# Patient Record
Sex: MALE | Race: WHITE | Employment: FULL TIME | URBAN - METROPOLITAN AREA
[De-identification: names, ages, dates, MRNs, and addresses within clinical notes are randomized per-mention and may not be internally consistent; named-entity substitution may affect disease eponyms.]

---

## 2019-12-30 ENCOUNTER — HOSPITAL ENCOUNTER (INPATIENT)
Age: 34
LOS: 2 days | Discharge: HOME | DRG: 871 | End: 2020-01-01
Attending: EMERGENCY MEDICINE | Admitting: FAMILY MEDICINE
Payer: COMMERCIAL

## 2019-12-30 ENCOUNTER — APPOINTMENT (OUTPATIENT)
Dept: GENERAL RADIOLOGY | Age: 34
DRG: 871 | End: 2019-12-30
Attending: EMERGENCY MEDICINE
Payer: COMMERCIAL

## 2019-12-30 ENCOUNTER — APPOINTMENT (OUTPATIENT)
Dept: CT IMAGING | Age: 34
DRG: 871 | End: 2019-12-30
Attending: EMERGENCY MEDICINE
Payer: COMMERCIAL

## 2019-12-30 DIAGNOSIS — J18.9 MULTIFOCAL PNEUMONIA: Primary | ICD-10-CM

## 2019-12-30 PROBLEM — R09.1 PLEURISY: Status: ACTIVE | Noted: 2019-12-30

## 2019-12-30 PROBLEM — A41.9 SEPSIS (HCC): Status: ACTIVE | Noted: 2019-12-30

## 2019-12-30 LAB
ALBUMIN SERPL-MCNC: 3.8 G/DL (ref 3.5–5)
ALBUMIN/GLOB SERPL: 1.1 {RATIO} (ref 1.2–3.5)
ALP SERPL-CCNC: 57 U/L (ref 50–136)
ALT SERPL-CCNC: 34 U/L (ref 12–65)
ANION GAP SERPL CALC-SCNC: 7 MMOL/L (ref 7–16)
AST SERPL-CCNC: 13 U/L (ref 15–37)
ATRIAL RATE: 111 BPM
BASOPHILS # BLD: 0.1 K/UL (ref 0–0.2)
BASOPHILS NFR BLD: 0 % (ref 0–2)
BILIRUB SERPL-MCNC: 1.2 MG/DL (ref 0.2–1.1)
BUN SERPL-MCNC: 17 MG/DL (ref 6–23)
CALCIUM SERPL-MCNC: 9.6 MG/DL (ref 8.3–10.4)
CALCULATED P AXIS, ECG09: 49 DEGREES
CALCULATED R AXIS, ECG10: 69 DEGREES
CALCULATED T AXIS, ECG11: 21 DEGREES
CHLORIDE SERPL-SCNC: 105 MMOL/L (ref 98–107)
CO2 SERPL-SCNC: 26 MMOL/L (ref 21–32)
CREAT SERPL-MCNC: 1.12 MG/DL (ref 0.8–1.5)
D DIMER PPP FEU-MCNC: 0.7 UG/ML(FEU)
DIAGNOSIS, 93000: NORMAL
DIFFERENTIAL METHOD BLD: ABNORMAL
EOSINOPHIL # BLD: 0 K/UL (ref 0–0.8)
EOSINOPHIL NFR BLD: 0 % (ref 0.5–7.8)
ERYTHROCYTE [DISTWIDTH] IN BLOOD BY AUTOMATED COUNT: 11.8 % (ref 11.9–14.6)
FLUAV AG NPH QL IA: NEGATIVE
FLUBV AG NPH QL IA: NEGATIVE
GLOBULIN SER CALC-MCNC: 3.5 G/DL (ref 2.3–3.5)
GLUCOSE SERPL-MCNC: 116 MG/DL (ref 65–100)
HCT VFR BLD AUTO: 42.7 % (ref 41.1–50.3)
HGB BLD-MCNC: 15.1 G/DL (ref 13.6–17.2)
IMM GRANULOCYTES # BLD AUTO: 0.2 K/UL (ref 0–0.5)
IMM GRANULOCYTES NFR BLD AUTO: 1 % (ref 0–5)
LACTATE BLD-SCNC: 1.77 MMOL/L (ref 0.5–1.9)
LACTATE SERPL-SCNC: 3 MMOL/L (ref 0.4–2)
LYMPHOCYTES # BLD: 1 K/UL (ref 0.5–4.6)
LYMPHOCYTES NFR BLD: 5 % (ref 13–44)
MCH RBC QN AUTO: 32 PG (ref 26.1–32.9)
MCHC RBC AUTO-ENTMCNC: 35.4 G/DL (ref 31.4–35)
MCV RBC AUTO: 90.5 FL (ref 79.6–97.8)
MONOCYTES # BLD: 0.9 K/UL (ref 0.1–1.3)
MONOCYTES NFR BLD: 4 % (ref 4–12)
NEUTS SEG # BLD: 19 K/UL (ref 1.7–8.2)
NEUTS SEG NFR BLD: 90 % (ref 43–78)
NRBC # BLD: 0 K/UL (ref 0–0.2)
P-R INTERVAL, ECG05: 130 MS
PLATELET # BLD AUTO: 196 K/UL (ref 150–450)
PMV BLD AUTO: 9.8 FL (ref 9.4–12.3)
POTASSIUM SERPL-SCNC: 3.6 MMOL/L (ref 3.5–5.1)
PROT SERPL-MCNC: 7.3 G/DL (ref 6.3–8.2)
Q-T INTERVAL, ECG07: 292 MS
QRS DURATION, ECG06: 82 MS
QTC CALCULATION (BEZET), ECG08: 397 MS
RBC # BLD AUTO: 4.72 M/UL (ref 4.23–5.6)
SODIUM SERPL-SCNC: 138 MMOL/L (ref 136–145)
SPECIMEN SOURCE: NORMAL
TROPONIN I SERPL-MCNC: <0.02 NG/ML (ref 0.02–0.05)
VENTRICULAR RATE, ECG03: 111 BPM
WBC # BLD AUTO: 21.2 K/UL (ref 4.3–11.1)

## 2019-12-30 PROCEDURE — 74011000250 HC RX REV CODE- 250: Performed by: EMERGENCY MEDICINE

## 2019-12-30 PROCEDURE — 87040 BLOOD CULTURE FOR BACTERIA: CPT

## 2019-12-30 PROCEDURE — 74011000258 HC RX REV CODE- 258: Performed by: EMERGENCY MEDICINE

## 2019-12-30 PROCEDURE — 94640 AIRWAY INHALATION TREATMENT: CPT

## 2019-12-30 PROCEDURE — 77030013140 HC MSK NEB VYRM -A

## 2019-12-30 PROCEDURE — 96375 TX/PRO/DX INJ NEW DRUG ADDON: CPT | Performed by: EMERGENCY MEDICINE

## 2019-12-30 PROCEDURE — 74011250637 HC RX REV CODE- 250/637: Performed by: EMERGENCY MEDICINE

## 2019-12-30 PROCEDURE — 93005 ELECTROCARDIOGRAM TRACING: CPT | Performed by: EMERGENCY MEDICINE

## 2019-12-30 PROCEDURE — 85025 COMPLETE CBC W/AUTO DIFF WBC: CPT

## 2019-12-30 PROCEDURE — 83605 ASSAY OF LACTIC ACID: CPT

## 2019-12-30 PROCEDURE — 74011250636 HC RX REV CODE- 250/636: Performed by: EMERGENCY MEDICINE

## 2019-12-30 PROCEDURE — 74011250636 HC RX REV CODE- 250/636: Performed by: FAMILY MEDICINE

## 2019-12-30 PROCEDURE — 80053 COMPREHEN METABOLIC PANEL: CPT

## 2019-12-30 PROCEDURE — 65660000000 HC RM CCU STEPDOWN

## 2019-12-30 PROCEDURE — 71046 X-RAY EXAM CHEST 2 VIEWS: CPT

## 2019-12-30 PROCEDURE — 74011636320 HC RX REV CODE- 636/320: Performed by: EMERGENCY MEDICINE

## 2019-12-30 PROCEDURE — 87804 INFLUENZA ASSAY W/OPTIC: CPT

## 2019-12-30 PROCEDURE — 36415 COLL VENOUS BLD VENIPUNCTURE: CPT

## 2019-12-30 PROCEDURE — 85379 FIBRIN DEGRADATION QUANT: CPT

## 2019-12-30 PROCEDURE — 96365 THER/PROPH/DIAG IV INF INIT: CPT | Performed by: EMERGENCY MEDICINE

## 2019-12-30 PROCEDURE — 84484 ASSAY OF TROPONIN QUANT: CPT

## 2019-12-30 PROCEDURE — 77030020263 HC SOL INJ SOD CL0.9% LFCR 1000ML

## 2019-12-30 PROCEDURE — 71260 CT THORAX DX C+: CPT

## 2019-12-30 PROCEDURE — 74011000302 HC RX REV CODE- 302: Performed by: FAMILY MEDICINE

## 2019-12-30 PROCEDURE — 86580 TB INTRADERMAL TEST: CPT | Performed by: FAMILY MEDICINE

## 2019-12-30 PROCEDURE — 74011250637 HC RX REV CODE- 250/637: Performed by: FAMILY MEDICINE

## 2019-12-30 PROCEDURE — 99285 EMERGENCY DEPT VISIT HI MDM: CPT | Performed by: EMERGENCY MEDICINE

## 2019-12-30 RX ORDER — DIPHENHYDRAMINE HYDROCHLORIDE 50 MG/ML
12.5 INJECTION, SOLUTION INTRAMUSCULAR; INTRAVENOUS
Status: DISCONTINUED | OUTPATIENT
Start: 2019-12-30 | End: 2020-01-01 | Stop reason: HOSPADM

## 2019-12-30 RX ORDER — ACETAMINOPHEN 325 MG/1
650 TABLET ORAL
Status: DISCONTINUED | OUTPATIENT
Start: 2019-12-30 | End: 2020-01-01 | Stop reason: HOSPADM

## 2019-12-30 RX ORDER — SODIUM CHLORIDE 9 MG/ML
75 INJECTION, SOLUTION INTRAVENOUS CONTINUOUS
Status: DISCONTINUED | OUTPATIENT
Start: 2019-12-31 | End: 2019-12-30

## 2019-12-30 RX ORDER — BISACODYL 5 MG
5 TABLET, DELAYED RELEASE (ENTERIC COATED) ORAL DAILY PRN
Status: DISCONTINUED | OUTPATIENT
Start: 2019-12-30 | End: 2020-01-01 | Stop reason: HOSPADM

## 2019-12-30 RX ORDER — HYDROMORPHONE HYDROCHLORIDE 1 MG/ML
0.5 INJECTION, SOLUTION INTRAMUSCULAR; INTRAVENOUS; SUBCUTANEOUS
Status: COMPLETED | OUTPATIENT
Start: 2019-12-30 | End: 2019-12-30

## 2019-12-30 RX ORDER — HEPARIN SODIUM 5000 [USP'U]/ML
5000 INJECTION, SOLUTION INTRAVENOUS; SUBCUTANEOUS EVERY 8 HOURS
Status: DISCONTINUED | OUTPATIENT
Start: 2019-12-30 | End: 2020-01-01 | Stop reason: HOSPADM

## 2019-12-30 RX ORDER — GUAIFENESIN 600 MG/1
1200 TABLET, EXTENDED RELEASE ORAL EVERY 12 HOURS
Status: DISCONTINUED | OUTPATIENT
Start: 2019-12-30 | End: 2020-01-01 | Stop reason: HOSPADM

## 2019-12-30 RX ORDER — KETOROLAC TROMETHAMINE 30 MG/ML
30 INJECTION, SOLUTION INTRAMUSCULAR; INTRAVENOUS
Status: COMPLETED | OUTPATIENT
Start: 2019-12-30 | End: 2019-12-30

## 2019-12-30 RX ORDER — SODIUM CHLORIDE 9 MG/ML
75 INJECTION, SOLUTION INTRAVENOUS CONTINUOUS
Status: DISCONTINUED | OUTPATIENT
Start: 2019-12-30 | End: 2019-12-31

## 2019-12-30 RX ORDER — ONDANSETRON 2 MG/ML
4 INJECTION INTRAMUSCULAR; INTRAVENOUS
Status: COMPLETED | OUTPATIENT
Start: 2019-12-30 | End: 2019-12-30

## 2019-12-30 RX ORDER — SODIUM CHLORIDE 0.9 % (FLUSH) 0.9 %
10 SYRINGE (ML) INJECTION
Status: COMPLETED | OUTPATIENT
Start: 2019-12-30 | End: 2019-12-30

## 2019-12-30 RX ORDER — HYDROCODONE BITARTRATE AND ACETAMINOPHEN 5; 325 MG/1; MG/1
1 TABLET ORAL
Status: DISCONTINUED | OUTPATIENT
Start: 2019-12-30 | End: 2020-01-01 | Stop reason: HOSPADM

## 2019-12-30 RX ORDER — DOXYCYCLINE 100 MG/1
100 CAPSULE ORAL
Status: COMPLETED | OUTPATIENT
Start: 2019-12-30 | End: 2019-12-30

## 2019-12-30 RX ORDER — MORPHINE SULFATE 2 MG/ML
1 INJECTION, SOLUTION INTRAMUSCULAR; INTRAVENOUS
Status: DISCONTINUED | OUTPATIENT
Start: 2019-12-30 | End: 2020-01-01 | Stop reason: HOSPADM

## 2019-12-30 RX ORDER — ACETAMINOPHEN 500 MG
1000 TABLET ORAL
Status: COMPLETED | OUTPATIENT
Start: 2019-12-30 | End: 2019-12-30

## 2019-12-30 RX ORDER — SODIUM CHLORIDE 0.9 % (FLUSH) 0.9 %
5-40 SYRINGE (ML) INJECTION AS NEEDED
Status: DISCONTINUED | OUTPATIENT
Start: 2019-12-30 | End: 2020-01-01 | Stop reason: HOSPADM

## 2019-12-30 RX ORDER — SODIUM CHLORIDE 9 MG/ML
125 INJECTION, SOLUTION INTRAVENOUS CONTINUOUS
Status: DISCONTINUED | OUTPATIENT
Start: 2019-12-30 | End: 2019-12-30

## 2019-12-30 RX ORDER — NALOXONE HYDROCHLORIDE 0.4 MG/ML
0.4 INJECTION, SOLUTION INTRAMUSCULAR; INTRAVENOUS; SUBCUTANEOUS AS NEEDED
Status: DISCONTINUED | OUTPATIENT
Start: 2019-12-30 | End: 2020-01-01 | Stop reason: HOSPADM

## 2019-12-30 RX ORDER — ONDANSETRON 2 MG/ML
4 INJECTION INTRAMUSCULAR; INTRAVENOUS
Status: DISCONTINUED | OUTPATIENT
Start: 2019-12-30 | End: 2020-01-01 | Stop reason: HOSPADM

## 2019-12-30 RX ORDER — KETOROLAC TROMETHAMINE 15 MG/ML
15 INJECTION, SOLUTION INTRAMUSCULAR; INTRAVENOUS
Status: DISPENSED | OUTPATIENT
Start: 2019-12-30 | End: 2019-12-31

## 2019-12-30 RX ORDER — SODIUM CHLORIDE 0.9 % (FLUSH) 0.9 %
5-40 SYRINGE (ML) INJECTION EVERY 8 HOURS
Status: DISCONTINUED | OUTPATIENT
Start: 2019-12-30 | End: 2020-01-01 | Stop reason: HOSPADM

## 2019-12-30 RX ORDER — IPRATROPIUM BROMIDE AND ALBUTEROL SULFATE 2.5; .5 MG/3ML; MG/3ML
3 SOLUTION RESPIRATORY (INHALATION)
Status: COMPLETED | OUTPATIENT
Start: 2019-12-30 | End: 2019-12-30

## 2019-12-30 RX ORDER — IBUPROFEN 400 MG/1
400 TABLET ORAL
Status: COMPLETED | OUTPATIENT
Start: 2019-12-30 | End: 2019-12-30

## 2019-12-30 RX ADMIN — CEFTRIAXONE 1 G: 1 INJECTION, POWDER, FOR SOLUTION INTRAMUSCULAR; INTRAVENOUS at 07:36

## 2019-12-30 RX ADMIN — Medication 5 ML: at 20:12

## 2019-12-30 RX ADMIN — KETOROLAC TROMETHAMINE 30 MG: 30 INJECTION, SOLUTION INTRAMUSCULAR at 06:32

## 2019-12-30 RX ADMIN — AZITHROMYCIN MONOHYDRATE 500 MG: 500 INJECTION, POWDER, LYOPHILIZED, FOR SOLUTION INTRAVENOUS at 11:15

## 2019-12-30 RX ADMIN — Medication 10 ML: at 08:25

## 2019-12-30 RX ADMIN — GUAIFENESIN 1200 MG: 600 TABLET ORAL at 20:10

## 2019-12-30 RX ADMIN — ACETAMINOPHEN 1000 MG: 500 TABLET, FILM COATED ORAL at 10:14

## 2019-12-30 RX ADMIN — Medication 10 ML: at 15:20

## 2019-12-30 RX ADMIN — IOPAMIDOL 75 ML: 755 INJECTION, SOLUTION INTRAVENOUS at 08:25

## 2019-12-30 RX ADMIN — DOXYCYCLINE HYCLATE 100 MG: 100 CAPSULE ORAL at 10:14

## 2019-12-30 RX ADMIN — SODIUM CHLORIDE 100 ML: 900 INJECTION, SOLUTION INTRAVENOUS at 08:25

## 2019-12-30 RX ADMIN — HYDROCODONE BITARTRATE AND ACETAMINOPHEN 1 TABLET: 5; 325 TABLET ORAL at 15:39

## 2019-12-30 RX ADMIN — SODIUM CHLORIDE 125 ML/HR: 900 INJECTION, SOLUTION INTRAVENOUS at 11:15

## 2019-12-30 RX ADMIN — IBUPROFEN 400 MG: 400 TABLET, FILM COATED ORAL at 10:14

## 2019-12-30 RX ADMIN — HEPARIN SODIUM 5000 UNITS: 5000 INJECTION INTRAVENOUS; SUBCUTANEOUS at 20:10

## 2019-12-30 RX ADMIN — TUBERCULIN PURIFIED PROTEIN DERIVATIVE 5 UNITS: 5 INJECTION, SOLUTION INTRADERMAL at 11:15

## 2019-12-30 RX ADMIN — HYDROMORPHONE HYDROCHLORIDE 0.5 MG: 1 INJECTION, SOLUTION INTRAMUSCULAR; INTRAVENOUS; SUBCUTANEOUS at 06:32

## 2019-12-30 RX ADMIN — IPRATROPIUM BROMIDE AND ALBUTEROL SULFATE 3 ML: .5; 3 SOLUTION RESPIRATORY (INHALATION) at 07:54

## 2019-12-30 RX ADMIN — KETOROLAC TROMETHAMINE 15 MG: 15 INJECTION, SOLUTION INTRAMUSCULAR; INTRAVENOUS at 11:15

## 2019-12-30 RX ADMIN — ONDANSETRON 4 MG: 2 INJECTION INTRAMUSCULAR; INTRAVENOUS at 06:32

## 2019-12-30 RX ADMIN — SODIUM CHLORIDE 75 ML/HR: 900 INJECTION, SOLUTION INTRAVENOUS at 15:16

## 2019-12-30 RX ADMIN — KETOROLAC TROMETHAMINE 15 MG: 15 INJECTION, SOLUTION INTRAMUSCULAR; INTRAVENOUS at 20:10

## 2019-12-30 NOTE — ED PROVIDER NOTES
He 10year-old male with 12-hour history of sharp right lower chest pain. Radiates to his back. Worse with breathing and movements. Says subjective fever and chills. Very minimal cough nonproductive. No abdominal pain or vomiting. Feels somewhat short of breath. No history of pneumothorax. Vapes but does not smoke. No history of DVT or PE. No trauma recently. The history is provided by the patient. Chest Pain    This is a new problem. The current episode started 6 to 12 hours ago. The problem has been gradually worsening. The problem occurs constantly. The pain is associated with breathing, coughing and movement. The pain is present in the right side. The pain is moderate. The quality of the pain is described as stabbing and sharp. The pain radiates to the mid back. The symptoms are aggravated by movement and deep breathing. Associated symptoms include back pain, a fever and shortness of breath. Pertinent negatives include no abdominal pain, no cough, no diaphoresis, no hemoptysis, no leg pain, no malaise/fatigue, no nausea, no near-syncope, no palpitations, no sputum production and no vomiting. He has tried nothing for the symptoms. No risk factors for CADHis past medical history does not include DM, DVT, HTN or PE. Pertinent negatives include no cardiac catheterization. No past medical history on file. No past surgical history on file. No family history on file.     Social History     Socioeconomic History    Marital status:      Spouse name: Not on file    Number of children: Not on file    Years of education: Not on file    Highest education level: Not on file   Occupational History    Not on file   Social Needs    Financial resource strain: Not on file    Food insecurity:     Worry: Not on file     Inability: Not on file    Transportation needs:     Medical: Not on file     Non-medical: Not on file   Tobacco Use    Smoking status: Never Smoker    Smokeless tobacco: Never Used   Substance and Sexual Activity    Alcohol use: Yes    Drug use: Not on file    Sexual activity: Not on file   Lifestyle    Physical activity:     Days per week: Not on file     Minutes per session: Not on file    Stress: Not on file   Relationships    Social connections:     Talks on phone: Not on file     Gets together: Not on file     Attends Gnosticism service: Not on file     Active member of club or organization: Not on file     Attends meetings of clubs or organizations: Not on file     Relationship status: Not on file    Intimate partner violence:     Fear of current or ex partner: Not on file     Emotionally abused: Not on file     Physically abused: Not on file     Forced sexual activity: Not on file   Other Topics Concern    Not on file   Social History Narrative    Not on file         ALLERGIES: Patient has no known allergies. Review of Systems   Constitutional: Positive for fever. Negative for chills, diaphoresis and malaise/fatigue. Respiratory: Positive for shortness of breath. Negative for cough, hemoptysis and sputum production. Cardiovascular: Positive for chest pain. Negative for palpitations and near-syncope. Gastrointestinal: Negative for abdominal pain, diarrhea, nausea and vomiting. Genitourinary: Negative for dysuria and flank pain. Musculoskeletal: Positive for back pain. Negative for neck pain. Skin: Negative for color change and rash. Neurological: Negative for syncope. All other systems reviewed and are negative. Vitals:    12/30/19 0604   BP: 117/75   Pulse: (!) 117   Resp: 20   Temp: 100.1 °F (37.8 °C)   SpO2: 96%   Weight: 90.7 kg (200 lb)   Height: 5' 9\" (1.753 m)            Physical Exam  Vitals signs and nursing note reviewed. Constitutional:       General: He is not in acute distress. Appearance: He is well-developed. HENT:      Head: Normocephalic and atraumatic.       Right Ear: External ear normal.      Left Ear: External ear normal.      Mouth/Throat:      Pharynx: No oropharyngeal exudate. Eyes:      Conjunctiva/sclera: Conjunctivae normal.      Pupils: Pupils are equal, round, and reactive to light. Neck:      Musculoskeletal: Normal range of motion and neck supple. Cardiovascular:      Rate and Rhythm: Normal rate and regular rhythm. Heart sounds: No murmur. Pulmonary:      Effort: No respiratory distress. Breath sounds: Normal breath sounds. Chest:       Abdominal:      General: Bowel sounds are normal.      Palpations: Abdomen is soft. There is no mass. Tenderness: There is no tenderness. There is no guarding or rebound. Hernia: No hernia is present. Skin:     General: Skin is warm and dry. Neurological:      Mental Status: He is alert and oriented to person, place, and time. Gait: Gait normal.      Comments: Nl speech   Psychiatric:         Speech: Speech normal.          MDM  Number of Diagnoses or Management Options  Diagnosis management comments: Check EKG and troponin although I believe pain be noncardiac. Probable pleurisy. Chest x-ray to check for pneumonia, effusion, pneumothorax. Patient does vape. Check for bilateral infiltrates       Amount and/or Complexity of Data Reviewed  Clinical lab tests: ordered and reviewed  Tests in the radiology section of CPT®: ordered and reviewed  Tests in the medicine section of CPT®: ordered and reviewed  Independent visualization of images, tracings, or specimens: yes (KG shows sinus tachycardia 110.   No ST-T changes or ectopy.)    Risk of Complications, Morbidity, and/or Mortality  Presenting problems: moderate  Diagnostic procedures: minimal  Management options: low    Patient Progress  Patient progress: stable    ED Course as of Dec 30 0759   Mon Dec 30, 2019   0700 Handoff from Dr. Robin Campos reviewed  WBC elevated, CXR Pending, flu negative    [GH]   0756 0.7   D DIMER [GH]      ED Course User Index  [GH] Melquiades Alicea MD Procedures        CXR- concerning for RUL pneumonia    Recent Results (from the past 8 hour(s))   EKG, 12 LEAD, INITIAL    Collection Time: 12/30/19  6:06 AM   Result Value Ref Range    Ventricular Rate 111 BPM    Atrial Rate 111 BPM    P-R Interval 130 ms    QRS Duration 82 ms    Q-T Interval 292 ms    QTC Calculation (Bezet) 397 ms    Calculated P Axis 49 degrees    Calculated R Axis 69 degrees    Calculated T Axis 21 degrees    Diagnosis       !! AGE AND GENDER SPECIFIC ECG ANALYSIS !! Sinus tachycardia  Nonspecific T wave abnormality  Abnormal ECG  No previous ECGs available     CBC WITH AUTOMATED DIFF    Collection Time: 12/30/19  6:09 AM   Result Value Ref Range    WBC 21.2 (H) 4.3 - 11.1 K/uL    RBC 4.72 4.23 - 5.6 M/uL    HGB 15.1 13.6 - 17.2 g/dL    HCT 42.7 41.1 - 50.3 %    MCV 90.5 79.6 - 97.8 FL    MCH 32.0 26.1 - 32.9 PG    MCHC 35.4 (H) 31.4 - 35.0 g/dL    RDW 11.8 (L) 11.9 - 14.6 %    PLATELET 731 836 - 085 K/uL    MPV 9.8 9.4 - 12.3 FL    ABSOLUTE NRBC 0.00 0.0 - 0.2 K/uL    DF AUTOMATED      NEUTROPHILS 90 (H) 43 - 78 %    LYMPHOCYTES 5 (L) 13 - 44 %    MONOCYTES 4 4.0 - 12.0 %    EOSINOPHILS 0 (L) 0.5 - 7.8 %    BASOPHILS 0 0.0 - 2.0 %    IMMATURE GRANULOCYTES 1 0.0 - 5.0 %    ABS. NEUTROPHILS 19.0 (H) 1.7 - 8.2 K/UL    ABS. LYMPHOCYTES 1.0 0.5 - 4.6 K/UL    ABS. MONOCYTES 0.9 0.1 - 1.3 K/UL    ABS. EOSINOPHILS 0.0 0.0 - 0.8 K/UL    ABS. BASOPHILS 0.1 0.0 - 0.2 K/UL    ABS. IMM.  GRANS. 0.2 0.0 - 0.5 K/UL   METABOLIC PANEL, COMPREHENSIVE    Collection Time: 12/30/19  6:09 AM   Result Value Ref Range    Sodium 138 136 - 145 mmol/L    Potassium 3.6 3.5 - 5.1 mmol/L    Chloride 105 98 - 107 mmol/L    CO2 26 21 - 32 mmol/L    Anion gap 7 7 - 16 mmol/L    Glucose 116 (H) 65 - 100 mg/dL    BUN 17 6 - 23 MG/DL    Creatinine 1.12 0.8 - 1.5 MG/DL    GFR est AA >60 >60 ml/min/1.73m2    GFR est non-AA >60 >60 ml/min/1.73m2    Calcium 9.6 8.3 - 10.4 MG/DL    Bilirubin, total 1.2 (H) 0.2 - 1.1 MG/DL ALT (SGPT) 34 12 - 65 U/L    AST (SGOT) 13 (L) 15 - 37 U/L    Alk. phosphatase 57 50 - 136 U/L    Protein, total 7.3 6.3 - 8.2 g/dL    Albumin 3.8 3.5 - 5.0 g/dL    Globulin 3.5 2.3 - 3.5 g/dL    A-G Ratio 1.1 (L) 1.2 - 3.5     TROPONIN I    Collection Time: 12/30/19  6:09 AM   Result Value Ref Range    Troponin-I, Qt. <0.02 (L) 0.02 - 0.05 NG/ML   D DIMER    Collection Time: 12/30/19  6:09 AM   Result Value Ref Range    D DIMER 0.70 (HH) <0.56 ug/ml(FEU)   INFLUENZA A & B AG (RAPID TEST)    Collection Time: 12/30/19  6:34 AM   Result Value Ref Range    Influenza A Ag NEGATIVE  NEG      Influenza B Ag NEGATIVE  NEG      Source NASOPHARYNGEAL       Certainly pneumonia would cause this presentation with high white count low-grade fever tachycardia but could also have PE with pulmonary infarct. Patient has significant pain requiring opiate medications for relief. I believe a CT of the chest to further evaluate is indicated.  ======================  CT of the chest is negative for pulmonary emboli. He has a obvious right upper lobe pneumonia. I suspect he also has a left lower lobe pneumonia. He is otherwise healthy. Previously been well. Suddenly became sick yesterday. Having shaking chills chest pain requiring opiate medications    Suspect he has strep pneumo. Initially patient was going to be discharged. Was given antibiotics. Blood cultures were not drawn initially. After treatment here in the emergency department. Patient was ambulated. He became tachycardic into the 140s. Oxygen saturations on room air while laying in bed are 91 to 93%. I believe he would benefit from short admission for continued antibiotics observation. We will obtain blood cultures at this time. Request sputum cultures. Impression and Disposition:      Impression: No diagnosis found.      Condition at disposition: stable    Disposition:  Admission    Follow-up:   Follow-up Information    None          Discharge Medications:   Current Discharge Medication List           Abiodun Brown MD; 12/30/2019 @8:01 AM

## 2019-12-30 NOTE — ED TRIAGE NOTES
PT sts \"I can't catch my breath. It feels like something is stabbing me from the front to the back. I've been having a little bit of a fever\"    Pt presents ambulatory to triage in moderate distress. PT complains of pain to right side of chest. Pain described as stabbing \"all the way from the front through to my back\" Onset of sx yesterday. Pt described as sharp. Pt took 4 ibuprofen around 0300 with no relief. PT also complains of nausea. Pt denies cardiac hx. Pt wife confirms HTN. Pt vapes.

## 2019-12-30 NOTE — PROGRESS NOTES
TRANSFER - IN REPORT:    Verbal report received from Vesna Hernandez RN (name) on Washington Zapata  being received from ED (unit) for routine progression of care      Report consisted of patients Situation, Background, Assessment and   Recommendations(SBAR). Information from the following report(s) SBAR, Kardex and ED Summary was reviewed with the receiving nurse. Opportunity for questions and clarification was provided. Assessment completed upon patients arrival to unit and care assumed. SBAR received and given to primary receiving RN, Karen Baeza. Patient not on 8th floor @ transfer-in time.

## 2019-12-30 NOTE — PROGRESS NOTES
Problem: Falls - Risk of  Goal: *Absence of Falls  Description  Document Tonia Shipleysammy Fall Risk and appropriate interventions in the flowsheet.   Outcome: Progressing Towards Goal  Note: Fall Risk Interventions:            Medication Interventions: Patient to call before getting OOB    Elimination Interventions: Call light in reach, Patient to call for help with toileting needs              Problem: Patient Education: Go to Patient Education Activity  Goal: Patient/Family Education  Outcome: Progressing Towards Goal

## 2019-12-30 NOTE — PROGRESS NOTES
Assumed care of patient. Assessment completed and documented, see docflow. Patient arrived via stretcher; accompanied by transport. Patient alert and oriented in all spheres. Denies pain or needs. NAD noted at present. Respirations even and non labored on RA. Spouse at bedside encouraged to call for needs. Call light within reach. Will continue to monitor.

## 2019-12-30 NOTE — ED NOTES
Completed ambulating o2 sat at this time. Patient resting HR 111bpm with resting o2 sat 96% on RA. Upon movement patient HR increased to 142bpm with o2 sat fluctuating between % on RA. Patient was symptomatic with noted SOB and increased fatigue.

## 2019-12-30 NOTE — H&P
Hospitalist H&P Note     Admit Date:  2019  5:59 AM   Name:  Keysha Kan   Age:  27 y.o.  :  1985   MRN:  [id number]   PCP:  None  Treatment Team: Attending Provider: Trinh Rios MD; Primary Nurse: Charma Galeazzi, RN  Cough, fever, right-sided chest pain, shortness of breath  HPI:   59-year-old  male patient with no medical problems. Does do vaping, last was on Friday. Yesterday patient started feeling weak, bilateral lower extremity cramps, fever. This morning around 3 he woke up with shortness of breath or right-sided chest pain radiating to the back and cough. As the pain and shortness of breath got worse decided to come to emergency room for further evaluation. Right-sided chest pain, sharp in nature, worsened on taking a deep breath in. Cough dry. Patient otherwise did not complain of any dizziness or any bilateral lower extremity pain or swelling or any nausea vomiting or abdominal pain. Did not complain any left-sided chest pain. In the emergency room he was found to be tachycardic EKG showed sinus tachycardia, troponin less than 0.02 ,small movement with increased heart rate to a high of 140s. Patient had an elevated d-dimer of 0.7, CT chest with contrast showed right upper lobe pneumonia. WBC 21.2. Influenza negative. Temperature of 101.5 in the emergency room. Patient does meet the criteria for sepsis, will also be admitted for right-sided pneumonic process.     10 systems reviewed and negative except as noted in HPI.   past medical history -nil  past surgical history -nil  No Known Allergies   Social History     Tobacco Use    Smoking status: yes    Smokeless tobacco:  Vaping   Substance Use Topics    Alcohol use: Yes      family history - htn  Immunization History   Administered Date(s) Administered    TB Skin Test (PPD) Intradermal 2019     PTA Medications:  Prior to Admission Medications   Prescriptions Last Dose Informant Patient Reported? Taking?   oxyCODONE-acetaminophen (PERCOCET) 5-325 mg per tablet   No No   Sig: Take 1 Tab by mouth every four (4) hours as needed for Pain. Max Daily Amount: 6 Tabs. Facility-Administered Medications: None       Objective:     Patient Vitals for the past 24 hrs:   Temp Pulse Resp BP SpO2   12/30/19 1319 -- 99 -- 93/59 93 %   12/30/19 1239 -- (!) 108 -- 97/61 93 %   12/30/19 1200 -- (!) 106 -- 92/60 94 %   12/30/19 1139 -- (!) 116 -- 102/60 92 %   12/30/19 1125 98.8 °F (37.1 °C) -- -- -- --   12/30/19 1119 -- (!) 115 26 109/62 94 %   12/30/19 1001 (!) 101.5 °F (38.6 °C) -- -- -- --   12/30/19 0754 -- -- -- -- 97 %   12/30/19 0606 -- (!) 115 -- 117/75 96 %   12/30/19 0604 100.1 °F (37.8 °C) (!) 117 20 117/75 96 %     Oxygen Therapy  O2 Sat (%): 93 % (12/30/19 1319)  Pulse via Oximetry: 100 beats per minute (12/30/19 1319)  O2 Device: Room air (12/30/19 0754)  No intake or output data in the 24 hours ending 12/30/19 1339    Physical Exam:  General:    Well nourished. Alert. Eyes:   Normal sclera. Extraocular movements intact. ENT:  Normocephalic, atraumatic. Moist mucous membranes  CV:   Tachycardic no murmur, rub, or gallop. Lungs:  Mild coarse breath sounds right greater than left . Complains of sharp pressure chest pain on taking a deep breath in.  abdomen: Soft, nontender, nondistended. Bowel sounds normal.   Extremities: Warm and dry. No cyanosis or edema. Neurologic: CN II-XII grossly intact. Sensation intact. Skin:     No rashes or jaundice. Psych:  Normal mood and affect. I reviewed the labs, imaging, EKGs, telemetry, and other studies done this admission.   Data Review:   Recent Results (from the past 24 hour(s))   EKG, 12 LEAD, INITIAL    Collection Time: 12/30/19  6:06 AM   Result Value Ref Range    Ventricular Rate 111 BPM    Atrial Rate 111 BPM    P-R Interval 130 ms    QRS Duration 82 ms    Q-T Interval 292 ms    QTC Calculation (Bezet) 397 ms    Calculated P Axis 49 degrees    Calculated R Axis 69 degrees    Calculated T Axis 21 degrees    Diagnosis       !! AGE AND GENDER SPECIFIC ECG ANALYSIS !! Sinus tachycardia  Nonspecific T wave abnormality  Abnormal ECG  No previous ECGs available  Confirmed by ST VICENTE MARTINEZ MD (), ABYB MADDOX ([id number]) on 12/30/2019 9:04:15 AM     CBC WITH AUTOMATED DIFF    Collection Time: 12/30/19  6:09 AM   Result Value Ref Range    WBC 21.2 (H) 4.3 - 11.1 K/uL    RBC 4.72 4.23 - 5.6 M/uL    HGB 15.1 13.6 - 17.2 g/dL    HCT 42.7 41.1 - 50.3 %    MCV 90.5 79.6 - 97.8 FL    MCH 32.0 26.1 - 32.9 PG    MCHC 35.4 (H) 31.4 - 35.0 g/dL    RDW 11.8 (L) 11.9 - 14.6 %    PLATELET 171 461 - 476 K/uL    MPV 9.8 9.4 - 12.3 FL    ABSOLUTE NRBC 0.00 0.0 - 0.2 K/uL    DF AUTOMATED      NEUTROPHILS 90 (H) 43 - 78 %    LYMPHOCYTES 5 (L) 13 - 44 %    MONOCYTES 4 4.0 - 12.0 %    EOSINOPHILS 0 (L) 0.5 - 7.8 %    BASOPHILS 0 0.0 - 2.0 %    IMMATURE GRANULOCYTES 1 0.0 - 5.0 %    ABS. NEUTROPHILS 19.0 (H) 1.7 - 8.2 K/UL    ABS. LYMPHOCYTES 1.0 0.5 - 4.6 K/UL    ABS. MONOCYTES 0.9 0.1 - 1.3 K/UL    ABS. EOSINOPHILS 0.0 0.0 - 0.8 K/UL    ABS. BASOPHILS 0.1 0.0 - 0.2 K/UL    ABS. IMM. GRANS. 0.2 0.0 - 0.5 K/UL   METABOLIC PANEL, COMPREHENSIVE    Collection Time: 12/30/19  6:09 AM   Result Value Ref Range    Sodium 138 136 - 145 mmol/L    Potassium 3.6 3.5 - 5.1 mmol/L    Chloride 105 98 - 107 mmol/L    CO2 26 21 - 32 mmol/L    Anion gap 7 7 - 16 mmol/L    Glucose 116 (H) 65 - 100 mg/dL    BUN 17 6 - 23 MG/DL    Creatinine 1.12 0.8 - 1.5 MG/DL    GFR est AA >60 >60 ml/min/1.73m2    GFR est non-AA >60 >60 ml/min/1.73m2    Calcium 9.6 8.3 - 10.4 MG/DL    Bilirubin, total 1.2 (H) 0.2 - 1.1 MG/DL    ALT (SGPT) 34 12 - 65 U/L    AST (SGOT) 13 (L) 15 - 37 U/L    Alk.  phosphatase 57 50 - 136 U/L    Protein, total 7.3 6.3 - 8.2 g/dL    Albumin 3.8 3.5 - 5.0 g/dL    Globulin 3.5 2.3 - 3.5 g/dL    A-G Ratio 1.1 (L) 1.2 - 3.5     TROPONIN I    Collection Time: 12/30/19  6:09 AM Result Value Ref Range    Troponin-I, Qt. <0.02 (L) 0.02 - 0.05 NG/ML   D DIMER    Collection Time: 12/30/19  6:09 AM   Result Value Ref Range    D DIMER 0.70 (HH) <0.56 ug/ml(FEU)   INFLUENZA A & B AG (RAPID TEST)    Collection Time: 12/30/19  6:34 AM   Result Value Ref Range    Influenza A Ag NEGATIVE  NEG      Influenza B Ag NEGATIVE  NEG      Source NASOPHARYNGEAL     POC LACTIC ACID    Collection Time: 12/30/19 11:31 AM   Result Value Ref Range    Lactic Acid (POC) 1.77 0.5 - 1.9 mmol/L       All Micro Results     Procedure Component Value Units Date/Time    CULTURE, BLOOD [[id number]] Collected:  12/30/19 1108    Order Status:  Completed Specimen:  Blood Updated:  12/30/19 1134    CULTURE, BLOOD [[id number]] Collected:  12/30/19 1102    Order Status:  Completed Specimen:  Blood Updated:  12/30/19 1134    CULTURE, RESPIRATORY/SPUTUM/BRONCH Alex Gupta STAIN [[id number]]     Order Status:  Sent Specimen:  Sputum     INFLUENZA A & B AG (RAPID TEST) [[id number]] Collected:  12/30/19 0634    Order Status:  Completed Specimen:  Nasopharyngeal from Nasal washing Updated:  12/30/19 0654     Influenza A Ag NEGATIVE         Comment: NEGATIVE FOR THE PRESENCE OF INFLUENZA A ANTIGEN  INFECTION DUE TO INFLUENZA A CANNOT BE RULED OUT. BECAUSE THE ANTIGEN PRESENT IN THE SAMPLE MAY BE BELOW  THE DETECTION LIMIT OF THE TEST. A NEGATIVE TEST IS PRESUMPTIVE AND IT IS RECOMMENDED THAT THESE RESULTS BE CONFIRMED BY VIRAL CULTURE OR AN FDA-CLEARED INFLUENZA A AND B MOLECULAR ASSAY. Influenza B Ag NEGATIVE         Comment: NEGATIVE FOR THE PRESENCE OF INFLUENZA B ANTIGEN  INFECTION DUE TO INFLUENZA B CANNOT BE RULED OUT. BECAUSE THE ANTIGEN PRESENT IN THE SAMPLE MAY BE BELOW  THE DETECTION LIMIT OF THE TEST. A NEGATIVE TEST IS PRESUMPTIVE AND IT IS RECOMMENDED THAT THESE RESULTS BE CONFIRMED BY VIRAL CULTURE OR AN FDA-CLEARED INFLUENZA A AND B MOLECULAR ASSAY.           Source NASOPHARYNGEAL             Other Studies:  Xr Chest Pa Lat    Result Date: 12/30/2019  Frontal and lateral views of the chest 12/30/2019 Comparison: none Indication: Right-sided chest pain, shortness of breath FINDINGS: The cardiac and mediastinal contours are within normal limits. There is focal infiltrate with volume loss right upper lobe posteriorly adjacent to the superior aspect of the right major fissure on the lateral view. No discrete acute osseous lesion seen. IMPRESSION: Favor right upper lobe pneumonia. Recommend PA and lateral chest in one month to assure resolution. Ct Chest W Cont    Result Date: 12/30/2019  CT OF THE CHEST WITH INTRAVENOUS CONTRAST. INDICATION: Right upper lobe pneumonia. Leukocytosis. Right-sided chest pain radiating to back. COMPARISON: None. TECHNIQUE:   2.5 mm axial scans from above the aortic arch to the lung bases with 75 cc nonionic intravenous contrast without acute complication. Intravenous contrast was given to evaluate for pulmonary embolism. Radiation dose reduction techniques were used for this study. Our CT scanners use one or more of the following:  Automated exposure control, adjustment of the mA and or kV according to patient size, iterative reconstruction. FINDINGS:  -Pulmonary artery opacification: Adequate  -No intraluminal filling defects within the pulmonary arterial tree to suggest pulmonary embolism.  -Aorta is normal caliber with a uniform lumen without evidence of dissection. -Mediastinum/surjit: No grossly enlarged lymph nodes. -Lungs lobar consolidation posterior segment right upper lobe with a smaller focus anterior segment.  -Included portion of the upper abdomen unremarkable. Adrenal glands normal size. No gross bony lesions. .     IMPRESSION:  Right upper lobe pneumonia.        Assessment and Plan:     Hospital Problems as of 12/30/2019 Never Reviewed          Codes Class Noted - Resolved POA    Pleurisy ICD-10-CM: R09.1  ICD-9-CM: 511.0  12/30/2019 - Present Unknown        Sepsis ([facility] ICD-10-CM: A41.9  ICD-9-CM: 038.9, 995.91  12/30/2019 - Present Unknown        PNA (pneumonia) ICD-10-CM: J18.9  ICD-9-CM: 024  12/30/2019 - Present Unknown              PLAN:  -Sepsis-  -right upper lobe pneumonia-continue Rocephin and Zithromax, will also PPD. -Pleuritic retro-chest pain secondary pneumonia    Advised on stopping vaping.     Advance life care discussed with patient, full code    DVT ppx: Heparin  Anticipated DC needs:    Code status:  Full  Estimated LOS:  Greater than 2 midnights  Risk:  high    Signed:  Amisha Dunlap MD

## 2019-12-30 NOTE — ED NOTES
TRANSFER - OUT REPORT:    Verbal report given to [id number] (name) on Roxy Carlos  being transferred to [address] (unit) for routine progression of care       Report consisted of patients Situation, Background, Assessment and   Recommendations(SBAR). Information from the following report(s) SBAR was reviewed with the receiving nurse. Lines:   Peripheral IV 12/30/19 Right Antecubital (Active)        Opportunity for questions and clarification was provided.       Patient transported with:   [employer]

## 2019-12-31 LAB
ANION GAP SERPL CALC-SCNC: 8 MMOL/L (ref 7–16)
BASOPHILS # BLD: 0 K/UL (ref 0–0.2)
BASOPHILS NFR BLD: 0 % (ref 0–2)
BUN SERPL-MCNC: 14 MG/DL (ref 6–23)
CALCIUM SERPL-MCNC: 9 MG/DL (ref 8.3–10.4)
CHLORIDE SERPL-SCNC: 109 MMOL/L (ref 98–107)
CO2 SERPL-SCNC: 23 MMOL/L (ref 21–32)
CREAT SERPL-MCNC: 0.97 MG/DL (ref 0.8–1.5)
DIFFERENTIAL METHOD BLD: ABNORMAL
EOSINOPHIL # BLD: 0.1 K/UL (ref 0–0.8)
EOSINOPHIL NFR BLD: 1 % (ref 0.5–7.8)
ERYTHROCYTE [DISTWIDTH] IN BLOOD BY AUTOMATED COUNT: 12.1 % (ref 11.9–14.6)
GLUCOSE SERPL-MCNC: 108 MG/DL (ref 65–100)
HCT VFR BLD AUTO: 36.5 % (ref 41.1–50.3)
HGB BLD-MCNC: 12.7 G/DL (ref 13.6–17.2)
IMM GRANULOCYTES # BLD AUTO: 0.6 K/UL (ref 0–0.5)
IMM GRANULOCYTES NFR BLD AUTO: 4 % (ref 0–5)
LACTATE SERPL-SCNC: 1.9 MMOL/L (ref 0.4–2)
LYMPHOCYTES # BLD: 1.3 K/UL (ref 0.5–4.6)
LYMPHOCYTES NFR BLD: 8 % (ref 13–44)
MCH RBC QN AUTO: 31.9 PG (ref 26.1–32.9)
MCHC RBC AUTO-ENTMCNC: 34.8 G/DL (ref 31.4–35)
MCV RBC AUTO: 91.7 FL (ref 79.6–97.8)
MONOCYTES # BLD: 0.7 K/UL (ref 0.1–1.3)
MONOCYTES NFR BLD: 4 % (ref 4–12)
NEUTS SEG # BLD: 14.9 K/UL (ref 1.7–8.2)
NEUTS SEG NFR BLD: 84 % (ref 43–78)
NRBC # BLD: 0 K/UL (ref 0–0.2)
PLATELET # BLD AUTO: 169 K/UL (ref 150–450)
PMV BLD AUTO: 10.2 FL (ref 9.4–12.3)
POTASSIUM SERPL-SCNC: 3.9 MMOL/L (ref 3.5–5.1)
RBC # BLD AUTO: 3.98 M/UL (ref 4.23–5.6)
SODIUM SERPL-SCNC: 140 MMOL/L (ref 136–145)
WBC # BLD AUTO: 17.7 K/UL (ref 4.3–11.1)

## 2019-12-31 PROCEDURE — 80048 BASIC METABOLIC PNL TOTAL CA: CPT

## 2019-12-31 PROCEDURE — 74011250637 HC RX REV CODE- 250/637: Performed by: FAMILY MEDICINE

## 2019-12-31 PROCEDURE — 77030020263 HC SOL INJ SOD CL0.9% LFCR 1000ML

## 2019-12-31 PROCEDURE — 36415 COLL VENOUS BLD VENIPUNCTURE: CPT

## 2019-12-31 PROCEDURE — 74011250636 HC RX REV CODE- 250/636: Performed by: FAMILY MEDICINE

## 2019-12-31 PROCEDURE — 65660000000 HC RM CCU STEPDOWN

## 2019-12-31 PROCEDURE — 74011250637 HC RX REV CODE- 250/637: Performed by: INTERNAL MEDICINE

## 2019-12-31 PROCEDURE — 74011000258 HC RX REV CODE- 258: Performed by: FAMILY MEDICINE

## 2019-12-31 PROCEDURE — 85025 COMPLETE CBC W/AUTO DIFF WBC: CPT

## 2019-12-31 RX ORDER — BENZONATATE 100 MG/1
100 CAPSULE ORAL
Status: DISCONTINUED | OUTPATIENT
Start: 2019-12-31 | End: 2020-01-01 | Stop reason: HOSPADM

## 2019-12-31 RX ADMIN — Medication 10 ML: at 05:40

## 2019-12-31 RX ADMIN — GUAIFENESIN 1200 MG: 600 TABLET ORAL at 08:35

## 2019-12-31 RX ADMIN — BENZONATATE 100 MG: 100 CAPSULE ORAL at 03:54

## 2019-12-31 RX ADMIN — HEPARIN SODIUM 5000 UNITS: 5000 INJECTION INTRAVENOUS; SUBCUTANEOUS at 03:27

## 2019-12-31 RX ADMIN — Medication 5 ML: at 13:21

## 2019-12-31 RX ADMIN — GUAIFENESIN 1200 MG: 600 TABLET ORAL at 20:49

## 2019-12-31 RX ADMIN — HYDROCODONE BITARTRATE AND ACETAMINOPHEN 1 TABLET: 5; 325 TABLET ORAL at 20:49

## 2019-12-31 RX ADMIN — CEFTRIAXONE 1 G: 1 INJECTION, POWDER, FOR SOLUTION INTRAMUSCULAR; INTRAVENOUS at 08:38

## 2019-12-31 RX ADMIN — Medication 10 ML: at 20:51

## 2019-12-31 RX ADMIN — HEPARIN SODIUM 5000 UNITS: 5000 INJECTION INTRAVENOUS; SUBCUTANEOUS at 19:18

## 2019-12-31 RX ADMIN — AZITHROMYCIN MONOHYDRATE 500 MG: 500 INJECTION, POWDER, LYOPHILIZED, FOR SOLUTION INTRAVENOUS at 11:01

## 2019-12-31 NOTE — PROGRESS NOTES
Hospitalist Progress Note     Admit Date:  2019  5:59 AM   Name:  Tyler De Dios   Age:  27 y.o.  :  1985   MRN:  [id number]   PCP:  None  Treatment Team: Attending Provider: Thais Lane MD; Staff Nurse: Christa Shoemaker RN; Care Manager: Kayd Vasquez RN; Utilization Review: Staci Elizalde RN; Primary Nurse: Robina Headings    Subjective:   74-year-old  male patient with no medical problems. Does do vaping, last was on Friday. Yesterday patient started feeling weak, bilateral lower extremity cramps, fever. This morning around 3 he woke up with shortness of breath or right-sided chest pain radiating to the back and cough. As the pain and shortness of breath got worse decided to come to emergency room for further evaluation. Patient does meet the criteria for sepsis, will also be admitted for right-sided pneumonic process. 2019  Says right-sided chest pain on taking a deep breath is better. Not tachycardic. Anxious to go home. Objective:     Patient Vitals for the past 24 hrs:   Temp Pulse Resp BP SpO2   19 1510 97.9 °F (36.6 °C) 84 18 123/61 97 %   19 1152 -- 93 -- -- --   19 1117 -- -- -- -- 96 %   19 1116 98.3 °F (36.8 °C) 93 17 133/80 97 %   19 0800 -- (!) 105 -- -- --   19 0726 98.6 °F (37 °C) (!) 105 18 135/75 --   19 0713 98.6 °F (37 °C) (!) 105 18 135/75 96 %   19 0312 99.7 °F (37.6 °C) 99 18 122/78 96 %   19 2315 98.4 °F (36.9 °C) 95 18 122/69 96 %   19 -- 96 -- -- --   19 1921 98.4 °F (36.9 °C) 94 20 99/70 96 %     Oxygen Therapy  O2 Sat (%): 97 % (19 1510)  Pulse via Oximetry: 111 beats per minute (19 1117)  O2 Device: Room air (19 1117)    Intake/Output Summary (Last 24 hours) at 2019 1612  Last data filed at 2019 0900  Gross per 24 hour   Intake 240 ml   Output --   Net 240 ml         General:    Well nourished. Alert. HEENT-normal  CV:   RRR. No murmur, rub, or gallop. Lungs:   Bilateral coarse breath sounds right greater than left improving  Abdomen:   Soft, nontender, nondistended. CNS-no focal neurological deficit  Extremities: Warm and dry. No cyanosis or edema. Skin:     No rashes or jaundice. Data Review:  I have reviewed all labs, meds, telemetry events, and studies from the last 24 hours. Recent Results (from the past 24 hour(s))   LACTIC ACID    Collection Time: 12/30/19  4:22 PM   Result Value Ref Range    Lactic acid 3.0 (HH) 0.4 - 2.0 MMOL/L   LACTIC ACID    Collection Time: 12/30/19 11:32 PM   Result Value Ref Range    Lactic acid 1.9 0.4 - 2.0 MMOL/L   METABOLIC PANEL, BASIC    Collection Time: 12/31/19  9:24 AM   Result Value Ref Range    Sodium 140 136 - 145 mmol/L    Potassium 3.9 3.5 - 5.1 mmol/L    Chloride 109 (H) 98 - 107 mmol/L    CO2 23 21 - 32 mmol/L    Anion gap 8 7 - 16 mmol/L    Glucose 108 (H) 65 - 100 mg/dL    BUN 14 6 - 23 MG/DL    Creatinine 0.97 0.8 - 1.5 MG/DL    GFR est AA >60 >60 ml/min/1.73m2    GFR est non-AA >60 >60 ml/min/1.73m2    Calcium 9.0 8.3 - 10.4 MG/DL   CBC WITH AUTOMATED DIFF    Collection Time: 12/31/19  9:24 AM   Result Value Ref Range    WBC 17.7 (H) 4.3 - 11.1 K/uL    RBC 3.98 (L) 4.23 - 5.6 M/uL    HGB 12.7 (L) 13.6 - 17.2 g/dL    HCT 36.5 (L) 41.1 - 50.3 %    MCV 91.7 79.6 - 97.8 FL    MCH 31.9 26.1 - 32.9 PG    MCHC 34.8 31.4 - 35.0 g/dL    RDW 12.1 11.9 - 14.6 %    PLATELET 197 086 - 178 K/uL    MPV 10.2 9.4 - 12.3 FL    ABSOLUTE NRBC 0.00 0.0 - 0.2 K/uL    DF AUTOMATED      NEUTROPHILS 84 (H) 43 - 78 %    LYMPHOCYTES 8 (L) 13 - 44 %    MONOCYTES 4 4.0 - 12.0 %    EOSINOPHILS 1 0.5 - 7.8 %    BASOPHILS 0 0.0 - 2.0 %    IMMATURE GRANULOCYTES 4 0.0 - 5.0 %    ABS. NEUTROPHILS 14.9 (H) 1.7 - 8.2 K/UL    ABS. LYMPHOCYTES 1.3 0.5 - 4.6 K/UL    ABS. MONOCYTES 0.7 0.1 - 1.3 K/UL    ABS. EOSINOPHILS 0.1 0.0 - 0.8 K/UL    ABS. BASOPHILS 0.0 0.0 - 0.2 K/UL    ABS. IMM. Lacey Voss. 0.6 (H) 0.0 - 0.5 K/UL        All Micro Results     Procedure Component Value Units Date/Time    CULTURE, BLOOD [[id number]] Collected:  12/30/19 1102    Order Status:  Completed Specimen:  Blood Updated:  12/31/19 0917     Special Requests: --        NO SPECIAL REQUESTS  RIGHT  HAND       Culture result: NO GROWTH AFTER 21 HOURS       CULTURE, BLOOD [[id number]] Collected:  12/30/19 1108    Order Status:  Completed Specimen:  Blood Updated:  12/31/19 0917     Special Requests: --        NO SPECIAL REQUESTS  LEFT  HAND       Culture result: NO GROWTH AFTER 21 HOURS       CULTURE, RESPIRATORY/SPUTUM/BRONCH Joe Alpers STAIN [[id number]]     Order Status:  Sent Specimen:  Sputum     INFLUENZA A & B AG (RAPID TEST) [[id number]] Collected:  12/30/19 0634    Order Status:  Completed Specimen:  Nasopharyngeal from Nasal washing Updated:  12/30/19 0654     Influenza A Ag NEGATIVE         Comment: NEGATIVE FOR THE PRESENCE OF INFLUENZA A ANTIGEN  INFECTION DUE TO INFLUENZA A CANNOT BE RULED OUT. BECAUSE THE ANTIGEN PRESENT IN THE SAMPLE MAY BE BELOW  THE DETECTION LIMIT OF THE TEST. A NEGATIVE TEST IS PRESUMPTIVE AND IT IS RECOMMENDED THAT THESE RESULTS BE CONFIRMED BY VIRAL CULTURE OR AN FDA-CLEARED INFLUENZA A AND B MOLECULAR ASSAY. Influenza B Ag NEGATIVE         Comment: NEGATIVE FOR THE PRESENCE OF INFLUENZA B ANTIGEN  INFECTION DUE TO INFLUENZA B CANNOT BE RULED OUT. BECAUSE THE ANTIGEN PRESENT IN THE SAMPLE MAY BE BELOW  THE DETECTION LIMIT OF THE TEST. A NEGATIVE TEST IS PRESUMPTIVE AND IT IS RECOMMENDED THAT THESE RESULTS BE CONFIRMED BY VIRAL CULTURE OR AN FDA-CLEARED INFLUENZA A AND B MOLECULAR ASSAY.           Source NASOPHARYNGEAL             Current Meds:  Current Facility-Administered Medications   Medication Dose Route Frequency    benzonatate (TESSALON) capsule 100 mg  100 mg Oral TID PRN    cefTRIAXone (ROCEPHIN) 1 g in 0.9% sodium chloride (MBP/ADV) 50 mL  1 g IntraVENous Q24H    azithromycin (ZITHROMAX) 500 mg in 0.9% sodium chloride (MBP/ADV) 250 mL  500 mg IntraVENous Q24H    ketorolac (TORADOL) injection 15 mg  15 mg IntraVENous Q6H PRN    sodium chloride (NS) flush 5-40 mL  5-40 mL IntraVENous Q8H    sodium chloride (NS) flush 5-40 mL  5-40 mL IntraVENous PRN    heparin (porcine) injection 5,000 Units  5,000 Units SubCUTAneous Q8H    acetaminophen (TYLENOL) tablet 650 mg  650 mg Oral Q4H PRN    HYDROcodone-acetaminophen (NORCO) 5-325 mg per tablet 1 Tab  1 Tab Oral Q4H PRN    morphine injection 1 mg  1 mg IntraVENous Q4H PRN    naloxone (NARCAN) injection 0.4 mg  0.4 mg IntraVENous PRN    diphenhydrAMINE (BENADRYL) injection 12.5 mg  12.5 mg IntraVENous Q4H PRN    ondansetron (ZOFRAN) injection 4 mg  4 mg IntraVENous Q4H PRN    bisacodyl (DULCOLAX) tablet 5 mg  5 mg Oral DAILY PRN    guaiFENesin ER (MUCINEX) tablet 1,200 mg  1,200 mg Oral Q12H       Other Studies (last 24 hours):  No results found. Assessment and Plan:     Hospital Problems as of 12/31/2019 Never Reviewed          Codes Class Noted - Resolved POA    Pleurisy ICD-10-CM: R09.1  ICD-9-CM: 511.0  12/30/2019 - Present Unknown        * (Principal) Sepsis ([facility] [address] ICD-10-CM: A41.9  ICD-9-CM: 038.9, 995.91  12/30/2019 - Present Unknown        PNA (pneumonia) ICD-10-CM: J18.9  ICD-9-CM: 650  12/30/2019 - Present Unknown              PLAN:    Sepsis-improving  -right upper lobe pneumonia-continue Rocephin and Zithromax, will also PPD. Continue Acapella and incentive spirometry. Sputum cultures ordered-pending.  -Pleuritic retro-chest pain secondary pneumonia-improving     Advised on stopping vaping. Spoke to patient and patient wife who is a nurse practitioner, went through the whole chart.   Recommended at least 1 more day of antibiotics before thinking of discharge.     Advance life care discussed with patient, full code     DVT ppx: Heparin  Anticipated DC needs:    Code status:  Full  Estimated LOS:  Greater than 2 midnights  Risk:  high  Signed:  Dontrell Thompson MD

## 2019-12-31 NOTE — PROGRESS NOTES
Patient is sitting up in . Respirations are even and unlabored. Patient denies any pain. No distress at this time.  Bed is low, locked and call light within reach

## 2019-12-31 NOTE — PROGRESS NOTES
MSN, CM:  Spoke with patient this AM about discharge planning. Patient lives in own home with 7 steps for entrance. Patient and wife also have 4 children that live in the home also (20, 25, 9, 9mts). Patient has mom/dad and grandparents that live close. Patient is independent with all ADL's and requires no equipment for ambulation. Patient does not have a PCP and states \"I have only been sick 2x in my life, I really don't need one\". Patient drives self and has no limitations. Patient does state he is insured with [employer] through his job. Patient's family to bring card in today. Patient does not appear to have any needs from Case Management at this time but Case Management will continue to follow. Care Management Interventions  PCP Verified by CM: Yes(patient has no PCP)  Mode of Transport at Discharge:  Other (see comment)(wife to transport)  Transition of Care Consult (CM Consult): Discharge Planning  Discharge Durable Medical Equipment: No  Physical Therapy Consult: No  Occupational Therapy Consult: No  Speech Therapy Consult: No  Current Support Network: Lives with Spouse, Own Home, Other, Family Lives Nearby(4 children (20,18,9,9 mts))  Confirm Follow Up Transport: Self  Freedom of Choice List was Provided with Basic Dialogue that Supports the Patient's Individualized Plan of Care/Goals, Treatment Preferences and Shares the Quality Data Associated with the Providers?: Yes  [employer] Information Provided?: No  Discharge Location  Discharge Placement: Home

## 2019-12-31 NOTE — PROGRESS NOTES
Received bedside shift report from off going nurse, Chloé Fuller. Patient resting in bed quietly. Respirations even and unlabored on room air. Bed low and locked. Bedside table, personal belongings and call light all within reach.

## 2019-12-31 NOTE — PROGRESS NOTES
Problem: Falls - Risk of  Goal: *Absence of Falls  Description  Document Adolphus Camden Fall Risk and appropriate interventions in the flowsheet.   Outcome: Progressing Towards Goal  Note: Fall Risk Interventions:            Medication Interventions: Patient to call before getting OOB    Elimination Interventions: Call light in reach              Problem: Patient Education: Go to Patient Education Activity  Goal: Patient/Family Education  Outcome: Progressing Towards Goal

## 2019-12-31 NOTE — PROGRESS NOTES
Bedside report received from  [facility]. Assessment completed. Bed is in low and locked position, with floor free of clutter. Respirations even and unlabored. Breath sounds clear. Alert and Oriented x4. RA. Family at bedside. Call light within reach.

## 2020-01-01 VITALS
WEIGHT: 200 LBS | RESPIRATION RATE: 18 BRPM | DIASTOLIC BLOOD PRESSURE: 78 MMHG | SYSTOLIC BLOOD PRESSURE: 119 MMHG | TEMPERATURE: 98.4 F | BODY MASS INDEX: 29.62 KG/M2 | OXYGEN SATURATION: 96 % | HEART RATE: 79 BPM | HEIGHT: 69 IN

## 2020-01-01 LAB
BASOPHILS # BLD: 0 K/UL (ref 0–0.2)
BASOPHILS NFR BLD: 0 % (ref 0–2)
DIFFERENTIAL METHOD BLD: ABNORMAL
EOSINOPHIL # BLD: 0.3 K/UL (ref 0–0.8)
EOSINOPHIL NFR BLD: 2 % (ref 0.5–7.8)
ERYTHROCYTE [DISTWIDTH] IN BLOOD BY AUTOMATED COUNT: 12 % (ref 11.9–14.6)
HCT VFR BLD AUTO: 37.9 % (ref 41.1–50.3)
HGB BLD-MCNC: 13.1 G/DL (ref 13.6–17.2)
IMM GRANULOCYTES # BLD AUTO: 0.1 K/UL (ref 0–0.5)
IMM GRANULOCYTES NFR BLD AUTO: 1 % (ref 0–5)
LYMPHOCYTES # BLD: 2 K/UL (ref 0.5–4.6)
LYMPHOCYTES NFR BLD: 16 % (ref 13–44)
MCH RBC QN AUTO: 31.3 PG (ref 26.1–32.9)
MCHC RBC AUTO-ENTMCNC: 34.6 G/DL (ref 31.4–35)
MCV RBC AUTO: 90.7 FL (ref 79.6–97.8)
MONOCYTES # BLD: 0.6 K/UL (ref 0.1–1.3)
MONOCYTES NFR BLD: 5 % (ref 4–12)
NEUTS SEG # BLD: 9.2 K/UL (ref 1.7–8.2)
NEUTS SEG NFR BLD: 76 % (ref 43–78)
NRBC # BLD: 0 K/UL (ref 0–0.2)
PLATELET # BLD AUTO: 212 K/UL (ref 150–450)
PMV BLD AUTO: 10.2 FL (ref 9.4–12.3)
RBC # BLD AUTO: 4.18 M/UL (ref 4.23–5.6)
WBC # BLD AUTO: 12.2 K/UL (ref 4.3–11.1)

## 2020-01-01 PROCEDURE — 74011000258 HC RX REV CODE- 258: Performed by: FAMILY MEDICINE

## 2020-01-01 PROCEDURE — 36415 COLL VENOUS BLD VENIPUNCTURE: CPT

## 2020-01-01 PROCEDURE — 74011250636 HC RX REV CODE- 250/636: Performed by: FAMILY MEDICINE

## 2020-01-01 PROCEDURE — 85025 COMPLETE CBC W/AUTO DIFF WBC: CPT

## 2020-01-01 PROCEDURE — 74011250637 HC RX REV CODE- 250/637: Performed by: FAMILY MEDICINE

## 2020-01-01 RX ORDER — CEFPODOXIME PROXETIL 200 MG/1
200 TABLET, FILM COATED ORAL 2 TIMES DAILY
Qty: 10 TAB | Refills: 0 | Status: SHIPPED | OUTPATIENT
Start: 2020-01-01 | End: 2020-01-06

## 2020-01-01 RX ORDER — CYCLOBENZAPRINE HCL 5 MG
5 TABLET ORAL
Qty: 10 TAB | Refills: 0 | Status: SHIPPED | OUTPATIENT
Start: 2020-01-01

## 2020-01-01 RX ORDER — BENZONATATE 100 MG/1
100 CAPSULE ORAL
Qty: 20 CAP | Refills: 0 | Status: SHIPPED | OUTPATIENT
Start: 2020-01-01 | End: 2020-01-08

## 2020-01-01 RX ORDER — AZITHROMYCIN 500 MG/1
500 TABLET, FILM COATED ORAL DAILY
Qty: 5 TAB | Refills: 0 | Status: SHIPPED | OUTPATIENT
Start: 2020-01-01 | End: 2020-01-06

## 2020-01-01 RX ADMIN — Medication 10 ML: at 05:46

## 2020-01-01 RX ADMIN — GUAIFENESIN 1200 MG: 600 TABLET ORAL at 09:17

## 2020-01-01 RX ADMIN — Medication 10 ML: at 12:00

## 2020-01-01 RX ADMIN — HEPARIN SODIUM 5000 UNITS: 5000 INJECTION INTRAVENOUS; SUBCUTANEOUS at 11:57

## 2020-01-01 RX ADMIN — HEPARIN SODIUM 5000 UNITS: 5000 INJECTION INTRAVENOUS; SUBCUTANEOUS at 04:04

## 2020-01-01 RX ADMIN — CEFTRIAXONE 1 G: 1 INJECTION, POWDER, FOR SOLUTION INTRAMUSCULAR; INTRAVENOUS at 09:18

## 2020-01-01 RX ADMIN — AZITHROMYCIN MONOHYDRATE 500 MG: 500 INJECTION, POWDER, LYOPHILIZED, FOR SOLUTION INTRAVENOUS at 11:56

## 2020-01-01 NOTE — DISCHARGE INSTRUCTIONS
Patient Education      Follow-up with PCP in one week  Pleurisy: Care Instructions  Your Care Instructions  Pleurisy is inflammation of the tissue that lines the inside of the chest and covers the lungs (pleura). Pleurisy is often caused by an infection, usually a virus. It also can be caused by other health problems, such as pneumonia or lupus. Pleurisy can cause sharp chest pain that gets worse when you cough or take a deep breath. You may need more tests to find out what is causing your pleurisy. Treatment depends on the cause. Pleurisy may come and go for a few days, or it may continue if the cause has not been treated. Home treatment can help ease symptoms. Follow-up care is a key part of your treatment and safety. Be sure to make and go to all appointments, and call your doctor if you are having problems. It's also a good idea to know your test results and keep a list of the medicines you take. How can you care for yourself at home? · Take an over-the-counter pain medicine, such as acetaminophen (Tylenol), ibuprofen (Advil, Motrin), or naproxen (Aleve). Read and follow all instructions on the label. · Do not take two or more pain medicines at the same time unless the doctor told you to. Many pain medicines have acetaminophen, which is Tylenol. Too much acetaminophen (Tylenol) can be harmful. · If your doctor prescribed antibiotics, take them as directed. Do not stop taking them just because you feel better. You need to take the full course of antibiotics. · Take cough medicine as directed if your doctor recommends it. · Avoid activities that make the pain worse. When should you call for help? Call 911 anytime you think you may need emergency care.  For example, call if:    · You have severe trouble breathing.     · You have severe chest pain.     · You passed out (lost consciousness).    Call your doctor now or seek immediate medical care if:    · You have a new or higher fever.    Watch closely for changes in your health, and be sure to contact your doctor if:    · You begin to cough up yellow or green mucus.     · You cough up blood.     · Your symptoms are not better in 3 or 4 days. Where can you learn more? Go to http://orlin-ivett.info/. Enter F346 in the search box to learn more about \"Pleurisy: Care Instructions. \"  Current as of: June 9, 2019  Content Version: 12.2  © 6310-2066 [employer]. Care instructions adapted under license by [employer] (which disclaims liability or warranty for this information). If you have questions about a medical condition or this instruction, always ask your healthcare professional. [address] any warranty or liability for your use of this information. Patient Education        Pneumonia: Care Instructions  Your Care Instructions    Pneumonia is an infection of the lungs. Most cases are caused by infections from bacteria or viruses. Pneumonia may be mild or very severe. If it is caused by bacteria, you will be treated with antibiotics. It may take a few weeks to a few months to recover fully from pneumonia, depending on how sick you were and whether your overall health is good. Follow-up care is a key part of your treatment and safety. Be sure to make and go to all appointments, and call your doctor if you are having problems. It's also a good idea to know your test results and keep a list of the medicines you take. How can you care for yourself at home? · Take your antibiotics exactly as directed. Do not stop taking the medicine just because you are feeling better. You need to take the full course of antibiotics. · Take your medicines exactly as prescribed. Call your doctor if you think you are having a problem with your medicine. · Get plenty of rest and sleep. You may feel weak and tired for a while, but your energy level will improve with time.   · To prevent dehydration, drink plenty of fluids, enough so that your urine is light yellow or clear like water. Choose water and other caffeine-free clear liquids until you feel better. If you have kidney, heart, or liver disease and have to limit fluids, talk with your doctor before you increase the amount of fluids you drink. · Take care of your cough so you can rest. A cough that brings up mucus from your lungs is common with pneumonia. It is one way your body gets rid of the infection. But if coughing keeps you from resting or causes severe fatigue and chest-wall pain, talk to your doctor. He or she may suggest that you take a medicine to reduce the cough. · Use a vaporizer or humidifier to add moisture to your bedroom. Follow the directions for cleaning the machine. · Do not smoke or allow others to smoke around you. Smoke will make your cough last longer. If you need help quitting, talk to your doctor about stop-smoking programs and medicines. These can increase your chances of quitting for good. · Take an over-the-counter pain medicine, such as acetaminophen (Tylenol), ibuprofen (Advil, Motrin), or naproxen (Aleve). Read and follow all instructions on the label. · Do not take two or more pain medicines at the same time unless the doctor told you to. Many pain medicines have acetaminophen, which is Tylenol. Too much acetaminophen (Tylenol) can be harmful. · If you were given a spirometer to measure how well your lungs are working, use it as instructed. This can help your doctor tell how your recovery is going. · To prevent pneumonia in the future, talk to your doctor about getting a flu vaccine (once a year) and a pneumococcal vaccine (one time only for most people). When should you call for help? Call 911 anytime you think you may need emergency care.  For example, call if:    · You have severe trouble breathing.    Call your doctor now or seek immediate medical care if:    · You cough up dark brown or bloody mucus (sputum).     · You have new or worse trouble breathing.     · You are dizzy or lightheaded, or you feel like you may faint.    Watch closely for changes in your health, and be sure to contact your doctor if:    · You have a new or higher fever.     · You are coughing more deeply or more often.     · You are not getting better after 2 days (48 hours).     · You do not get better as expected. Where can you learn more? Go to http://orlin-ivett.info/. Enter 01.84.63.10.33 in the search box to learn more about \"Pneumonia: Care Instructions. \"  Current as of: June 9, 2019  Content Version: 12.2  © 6103-3931 [employer]. Care instructions adapted under license by [employer] (which disclaims liability or warranty for this information). If you have questions about a medical condition or this instruction, always ask your healthcare professional. [place] [address] any warranty or liability for your use of this information. Patient Education        Sepsis: Care Instructions  Your Care Instructions    Sepsis is an intense reaction to an infection. It can cause deadly damage to the body and lead to a dangerously low blood pressure. You may have inflammation across large areas of your body. It can damage tissue and even go deep into your organs. Infections that can lead to sepsis include:  · A skin infection such as from a cut. · A lung infection like pneumonia. · A kidney infection. · A gut infection such as E. coli. It's important to care for yourself and try to avoid infections so that you don't get sepsis again. Follow-up care is a key part of your treatment and safety. Be sure to make and go to all appointments, and call your doctor if you are having problems. It's also a good idea to know your test results and keep a list of the medicines you take. How can you care for yourself at home? · If your doctor prescribed antibiotics, take them as directed.  Do not stop taking them just because you feel better. You need to take the full course of antibiotics. · Help prevent infections that could lead to sepsis:  ? Try to avoid colds and flu. If you must be around people who have a cold or the flu, wash your hands often. And get a flu vaccine every year. ? Get a pneumococcal vaccine shot (to prevent pneumonia, meningitis, and other infections). If you have had one before, ask your doctor if you need another dose. ? Clean any wounds or scrapes. · Do not smoke or use other tobacco products. When you quit smoking, you are less likely to get a cold, the flu, bronchitis, and pneumonia. If you need help quitting, talk to your doctor about stop-smoking programs and medicines. These can increase your chances of quitting for good. · To prevent dehydration, drink plenty of fluids. Choose water and other caffeine-free clear liquids until you feel better. If you have kidney, heart, or liver disease and have to limit fluids, talk with your doctor before you increase the amount of fluids you drink. · Eat a healthy diet. Include fruits, vegetables, and whole grains in your diet every day. · If your doctor recommends it, try doing some physical activity. Walking is a good choice. Bit by bit, increase the amount you walk every day. When should you call for help? Call 911 anytime you think you may need emergency care. For example, call if:    · You passed out (lost consciousness).    Call your doctor now or seek immediate medical care if:    · You have symptoms of sepsis. These may include:  ? Shortness of breath. ? A fast heart rate. ? Cool, pale, or clammy skin. ? Feeling confused.     · You are dizzy or lightheaded, or you feel like you may faint.     · You have a fever or chills.    Watch closely for changes in your health, and be sure to contact your doctor if:    · You do not get better as expected. Where can you learn more? Go to http://orlin-ivett.info/.   Enter H250 in the search box to learn more about \"Sepsis: Care Instructions. \"  Current as of: June 26, 2019  Content Version: 12.2  © 2069-7084 [employer]. Care instructions adapted under license by [employer] (which disclaims liability or warranty for this information). If you have questions about a medical condition or this instruction, always ask your healthcare professional. [address] any warranty or liability for your use of this information.

## 2020-01-01 NOTE — PROGRESS NOTES
Beside shift report received from [facility]  Patient lying in bed  Respirations present  No signs of distress  No needs expressed at this time  Safety measures in place

## 2020-01-01 NOTE — DISCHARGE SUMMARY
Hospitalist Discharge Summary     Patient ID:  Gabby Correia  [id number]  17 y.o.  1985  Admit date: 12/30/2019  5:59 AM  Discharge date and time: 1/1/2020  Attending: Adis Cadet MD  PCP:  None  Treatment Team: Attending Provider: Adis Cadet MD; Staff Nurse: Cisco Owens RN; Care Manager: Fernie Gilliam RN; Utilization Review: Junior Tacho RN; Primary Nurse: Bowen Steven    Principal Diagnosis Sepsis [facility])   Principal Problem:    Sepsis ([address] (12/30/2019)    Active Problems:    Pleurisy (12/30/2019)      PNA (pneumonia) (12/30/2019)             Hospital Course:  Please refer to the admission H&P for details of presentation. In summary, the patient is 76-year-old  male patient with no medical problems.  Does do vaping, last was on Friday. Yesterday patient started feeling weak, bilateral lower extremity cramps, fever.  This morning around 3 he woke up with shortness of breath or right-sided chest pain radiating to the back and cough.  As the pain and shortness of breath got worse decided to come to emergency room for further evaluation. Patient does meet the criteria for sepsis, will also be admitted for right-sided pneumonic process. Pt started on IV atbx and anti-tussives. Has clinically improved. Tolerating room air. Anxious to go home. Reporting right back and flank spasms/pain sounding muskuloskeletal. Will continue vantin/azithro at discharge to complete 1 week course and add flexeril for spasm. Advised not to combine flexeril with cough medicine.        Significant Diagnostic Studies:   Final [99] 12/30/2019 08:25 12/30/2019 08:32   Study Result     CT OF THE CHEST WITH INTRAVENOUS CONTRAST.     INDICATION: Right upper lobe pneumonia. Leukocytosis.  Right-sided chest pain  radiating to back.     COMPARISON: None.     TECHNIQUE:   2.5 mm axial scans from above the aortic arch to the lung bases  with 75 cc nonionic intravenous contrast without acute complication. Intravenous contrast was given to evaluate for pulmonary embolism. Radiation  dose reduction techniques were used for this study. Our CT scanners use one or  more of the following:  Automated exposure control, adjustment of the mA and or  kV according to patient size, iterative reconstruction.     FINDINGS:    -Pulmonary artery opacification: Adequate    -No intraluminal filling defects within the pulmonary arterial tree to suggest  pulmonary embolism.    -Aorta is normal caliber with a uniform lumen without evidence of dissection. -Mediastinum/surjit: No grossly enlarged lymph nodes.  -Lungs lobar consolidation posterior segment right upper lobe with a smaller  focus anterior segment.    -Included portion of the upper abdomen unremarkable. Adrenal glands normal size. No gross bony lesions. .     IMPRESSION  IMPRESSION:  Right upper lobe pneumonia.            Labs: Results:       Chemistry Recent Labs     12/31/19  0924 12/30/19  0609   * 116*    138   K 3.9 3.6   * 105   CO2 23 26   BUN 14 17   CREA 0.97 1.12   CA 9.0 9.6   AGAP 8 7   AP  --  57   TP  --  7.3   ALB  --  3.8   GLOB  --  3.5   AGRAT  --  1.1*      CBC w/Diff Recent Labs     01/01/20  0844 12/31/19  0924 12/30/19  0609   WBC 12.2* 17.7* 21.2*   RBC 4.18* 3.98* 4.72   HGB 13.1* 12.7* 15.1   HCT 37.9* 36.5* 42.7    169 196   GRANS 76 84* 90*   LYMPH 16 8* 5*   EOS 2 1 0*      Cardiac Enzymes No results for input(s): CPK, CKND1, SYMONE in the last 72 hours. No lab exists for component: CKRMB, TROIP   Coagulation No results for input(s): PTP, INR, APTT, INREXT in the last 72 hours. Lipid Panel No results found for: CHOL, CHOLPOCT, CHOLX, CHLST, CHOLV, 265162, HDL, HDLP, LDL, LDLC, DLDLP, 388155, VLDLC, VLDL, TGLX, TRIGL, TRIGP, TGLPOCT, CHHD, CHHDX   BNP No results for input(s): BNPP in the last 72 hours.    Liver Enzymes Recent Labs     12/30/19  0609   TP 7.3   ALB 3.8   AP 57   SGOT 13*      Thyroid Studies No results found for: T4, T3U, TSH, TSHEXT         Discharge Exam:  Visit Vitals  /78 (BP 1 Location: Left arm, BP Patient Position: At rest)   Pulse 79   Temp 98.4 °F (36.9 °C)   Resp 18   Ht 5' 9\" (1.753 m)   Wt 90.7 kg (200 lb)   SpO2 96%   BMI 29.53 kg/m²     General appearance: alert, cooperative, no distress, appears stated age  Lungs: clear to auscultation bilaterally  Heart: regular rate and rhythm, S1, S2 normal, no murmur, click, rub or gallop  Abdomen: soft, non-tender. Bowel sounds normal. No masses,  no organomegaly  Extremities: no cyanosis or edema  Neurologic: Grossly normal    Disposition: home  Discharge Condition: stable  Patient Instructions:   Current Discharge Medication List      START taking these medications    Details   benzonatate (TESSALON) 100 mg capsule Take 1 Cap by mouth three (3) times daily as needed for Cough for up to 7 days. Qty: 20 Cap, Refills: 0      cefpodoxime (VANTIN) 200 mg tablet Take 1 Tab by mouth two (2) times a day for 5 days. Qty: 10 Tab, Refills: 0      guaiFENesin ER (MUCINEX) 1,200 mg Ta12 ER tablet Take 1 Tab by mouth every twelve (12) hours. Qty: 20 Tab, Refills: 0      azithromycin (ZITHROMAX) 500 mg tab Take 1 Tab by mouth daily for 5 days. Qty: 5 Tab, Refills: 0      cyclobenzaprine (FLEXERIL) 5 mg tablet Take 1 Tab by mouth three (3) times daily as needed for Muscle Spasm(s).   Qty: 10 Tab, Refills: 0             Activity:as tolerated  Diet: regular    Follow-up  ·   PCP in 1 week  Time spent to discharge patient 35 minutes  Signed:  Aydee Haque DO  1/1/2020  1:37 PM

## 2020-01-01 NOTE — PROGRESS NOTES
Received bedside shift report from Valery Burr RN. Pt up in chair. No apparent distress. Respirations even and unlabored. Instructed to call for assistance with needs, as they arise. Pt voiced understanding.

## 2020-01-01 NOTE — PROGRESS NOTES
Problem: Falls - Risk of  Goal: *Absence of Falls  Description  Document Cruz Hutchinson Fall Risk and appropriate interventions in the flowsheet.   Outcome: Progressing Towards Goal  Note: Fall Risk Interventions:            Medication Interventions: Teach patient to arise slowly    Elimination Interventions: Call light in reach

## 2020-01-01 NOTE — PROGRESS NOTES
Discharge instructions, prescriptions, and list of PCPs given to patient  Discharge instruction explained to patient  Patient verbalizes understanding IV and remote tele box removed  Patient asked to call the desk when he is ready to leave

## 2020-01-04 LAB
BACTERIA SPEC CULT: NORMAL
BACTERIA SPEC CULT: NORMAL
SERVICE CMNT-IMP: NORMAL
SERVICE CMNT-IMP: NORMAL